# Patient Record
Sex: FEMALE | Race: WHITE | NOT HISPANIC OR LATINO | Employment: FULL TIME | URBAN - METROPOLITAN AREA
[De-identification: names, ages, dates, MRNs, and addresses within clinical notes are randomized per-mention and may not be internally consistent; named-entity substitution may affect disease eponyms.]

---

## 2020-12-01 ENCOUNTER — PATIENT MESSAGE (OUTPATIENT)
Dept: PRIMARY CARE CLINIC | Facility: CLINIC | Age: 20
End: 2020-12-01

## 2023-10-30 ENCOUNTER — OFFICE VISIT (OUTPATIENT)
Dept: PRIMARY CARE CLINIC | Facility: CLINIC | Age: 23
End: 2023-10-30
Payer: COMMERCIAL

## 2023-10-30 DIAGNOSIS — Z00.00 LABORATORY EXAM ORDERED AS PART OF ROUTINE GENERAL MEDICAL EXAMINATION: ICD-10-CM

## 2023-10-30 DIAGNOSIS — R11.0 NAUSEA: ICD-10-CM

## 2023-10-30 DIAGNOSIS — K30 INDIGESTION: ICD-10-CM

## 2023-10-30 DIAGNOSIS — Z76.89 ESTABLISHING CARE WITH NEW DOCTOR, ENCOUNTER FOR: Primary | ICD-10-CM

## 2023-10-30 PROCEDURE — 1159F MED LIST DOCD IN RCRD: CPT | Mod: CPTII,95,, | Performed by: INTERNAL MEDICINE

## 2023-10-30 PROCEDURE — 99203 PR OFFICE/OUTPT VISIT, NEW, LEVL III, 30-44 MIN: ICD-10-PCS | Mod: 95,,, | Performed by: INTERNAL MEDICINE

## 2023-10-30 PROCEDURE — 1159F PR MEDICATION LIST DOCUMENTED IN MEDICAL RECORD: ICD-10-PCS | Mod: CPTII,95,, | Performed by: INTERNAL MEDICINE

## 2023-10-30 PROCEDURE — 99203 OFFICE O/P NEW LOW 30 MIN: CPT | Mod: 95,,, | Performed by: INTERNAL MEDICINE

## 2023-10-30 RX ORDER — ONDANSETRON 4 MG/1
4 TABLET, ORALLY DISINTEGRATING ORAL EVERY 8 HOURS PRN
Qty: 30 TABLET | Refills: 0 | Status: SHIPPED | OUTPATIENT
Start: 2023-10-30 | End: 2024-01-29 | Stop reason: SDUPTHER

## 2023-10-30 RX ORDER — PANTOPRAZOLE SODIUM 40 MG/1
TABLET, DELAYED RELEASE ORAL
Qty: 42 TABLET | Refills: 0 | Status: SHIPPED | OUTPATIENT
Start: 2023-10-30 | End: 2023-12-12

## 2023-10-30 NOTE — PROGRESS NOTES
The patient location is: [place]  The chief complaint leading to consultation is: migraines    Visit type: audiovisual    Face to Face time with patient:   15 minutes of total time spent on the encounter, which includes face to face time and non-face to face time preparing to see the patient (eg, review of tests), Obtaining and/or reviewing separately obtained history, Documenting clinical information in the electronic or other health record, Independently interpreting results (not separately reported) and communicating results to the patient/family/caregiver, or Care coordination (not separately reported).         Each patient to whom he or she provides medical services by telemedicine is:  (1) informed of the relationship between the physician and patient and the respective role of any other health care provider with respect to management of the patient; and (2) notified that he or she may decline to receive medical services by telemedicine and may withdraw from such care at any time.    Notes:     Subjective     Patient ID: Marjan Kaba is a 23 y.o. female.    Chief Complaint: Migraine and Nausea      Abdominal Pain  This is a recurrent problem. The current episode started more than 1 month ago. The onset quality is gradual. The problem occurs every several days. The most recent episode lasted 1 hours. The problem has been unchanged. The pain is located in the RLQ, RUQ and generalized abdominal region. The pain is at a severity of 4/10. The pain is mild. The quality of the pain is burning, cramping and sharp. Associated symptoms include anorexia, arthralgias, belching, constipation, diarrhea, headaches, melena, myalgias, nausea, vomiting and weight loss. Pertinent negatives include no dysuria, fever, flatus, frequency, hematochezia or hematuria. The pain is aggravated by drinking alcohol and eating. The pain is relieved by Certain positions and vomiting. She has tried acetaminophen and antacids for the symptoms. The  treatment provided no relief. Her past medical history is significant for GERD. There is no history of abdominal surgery, colon cancer, Crohn's disease, gallstones, irritable bowel syndrome, pancreatitis, PUD or ulcerative colitis. Patient's medical history does not include kidney stones and UTI.       Past Medical History:   Diagnosis Date    ADHD     Bipolar 1 disorder     Depression     Sleep apnea, unspecified      Review of patient's allergies indicates:  No Known Allergies  No past surgical history on file.  Family History   Problem Relation Age of Onset    Lung cancer Maternal Grandmother     Heart disease Paternal Grandfather      Social History     Socioeconomic History    Marital status: Single   Tobacco Use    Smoking status: Former     Types: Cigarettes    Smokeless tobacco: Never   Substance and Sexual Activity    Alcohol use: Yes    Drug use: Yes     Types: Marijuana    Sexual activity: Yes     Partners: Male     Birth control/protection: I.U.D.         There were no vitals taken for this visit.  Outpatient Medications as of 10/30/2023   Medication Sig Dispense Refill    acyclovir (ZOVIRAX) 400 MG tablet Take 1 tablet (400 mg total) by mouth 2 (two) times daily. 60 tablet 11    buPROPion (WELLBUTRIN XL) 300 MG 24 hr tablet Take 300 mg by mouth every morning.      busPIRone (BUSPAR) 10 MG tablet TAKE 1 TABLET BY MOUTH IN THE MORNING AND AFTERNOON WITH FOOD      dextroamphetamine-amphetamine (ADDERALL XR) 15 MG 24 hr capsule       hydrOXYzine HCL (ATARAX) 25 MG tablet TAKE 2 TABLETS BY MOUTH EVERY NIGHT AT BEDTIME FOR SLEEP OR ANXIETY      lurasidone (LATUDA) 20 mg Tab tablet TAKE 1 TABLET BY MOUTH EVERY NIGHT AT BEDTIME WITH MEALS.      lurasidone (LATUDA) 40 mg Tab tablet TAKE 1 TABLET BY MOUTH AT BEDTIME WITH MEAL      pantoprazole (PROTONIX) 40 MG tablet 1 tablet in the AM with water 42 tablet 0    prazosin (MINIPRESS) 2 MG Cap Take 4 mg by mouth every evening.       Facility-Administered Medications  as of 10/30/2023   Medication Dose Route Frequency Provider Last Rate Last Admin    levonorgestreL (KYLEENA) 17.5 mcg/24 hrs (5 yrs) 19.5 mg IUD 17.5 mcg  1 Intra Uterine Device Intrauterine 1 time in Clinic/HOD Cindi Huerta MD           Review of Systems   Constitutional:  Positive for weight loss. Negative for fever.   Gastrointestinal:  Positive for abdominal pain, anorexia, constipation, diarrhea, melena, nausea and vomiting. Negative for flatus and hematochezia.   Genitourinary:  Negative for dysuria, frequency and hematuria.   Musculoskeletal:  Positive for arthralgias and myalgias.   Neurological:  Positive for headaches.          Objective     Physical Exam  Constitutional:       General: She is not in acute distress.     Appearance: Normal appearance. She is not ill-appearing, toxic-appearing or diaphoretic.   Pulmonary:      Effort: No respiratory distress.   Neurological:      Mental Status: She is alert and oriented to person, place, and time.   Psychiatric:         Mood and Affect: Mood normal.         Behavior: Behavior normal.            Assessment and Plan     1. Establishing care with new doctor, encounter for    2. Laboratory exam ordered as part of routine general medical examination  -     Lipid Panel; Future; Expected date: 10/30/2023  -     Urinalysis; Future; Expected date: 10/30/2023  -     Hemoglobin A1C; Future; Expected date: 10/30/2023  -     TSH; Future; Expected date: 10/30/2023  -     Comprehensive Metabolic Panel; Future; Expected date: 10/30/2023  -     CBC Auto Differential; Future; Expected date: 10/30/2023  -     HEPATITIS C ANTIBODY; Future; Expected date: 10/30/2023    3. Indigestion  -     pantoprazole (PROTONIX) 40 MG tablet; 1 tablet in the AM with water  Dispense: 42 tablet; Refill: 0    4. Nausea  -     ondansetron (ZOFRAN-ODT) 4 MG TbDL; Take 1 tablet (4 mg total) by mouth every 8 (eight) hours as needed (nausea).  Dispense: 30 tablet; Refill: 0       Gerd  diet  Labs a few days before in person physical.      Follow up if symptoms worsen or fail to improve.      There is no immunization history on file for this patient.

## 2023-12-12 DIAGNOSIS — K30 INDIGESTION: ICD-10-CM

## 2023-12-12 RX ORDER — PANTOPRAZOLE SODIUM 40 MG/1
TABLET, DELAYED RELEASE ORAL
Qty: 42 TABLET | Refills: 0 | Status: SHIPPED | OUTPATIENT
Start: 2023-12-12 | End: 2024-01-22

## 2024-01-20 DIAGNOSIS — K30 INDIGESTION: ICD-10-CM

## 2024-01-22 RX ORDER — PANTOPRAZOLE SODIUM 40 MG/1
TABLET, DELAYED RELEASE ORAL
Qty: 42 TABLET | Refills: 0 | Status: SHIPPED | OUTPATIENT
Start: 2024-01-22 | End: 2024-03-05

## 2024-01-29 DIAGNOSIS — R11.0 NAUSEA: ICD-10-CM

## 2024-01-30 RX ORDER — ONDANSETRON 4 MG/1
4 TABLET, ORALLY DISINTEGRATING ORAL EVERY 8 HOURS PRN
Qty: 30 TABLET | Refills: 0 | Status: SHIPPED | OUTPATIENT
Start: 2024-01-30 | End: 2024-01-31

## 2024-03-04 DIAGNOSIS — K30 INDIGESTION: ICD-10-CM

## 2024-03-05 DIAGNOSIS — K30 INDIGESTION: ICD-10-CM

## 2024-03-05 RX ORDER — PANTOPRAZOLE SODIUM 40 MG/1
TABLET, DELAYED RELEASE ORAL
Qty: 42 TABLET | Refills: 0 | Status: SHIPPED | OUTPATIENT
Start: 2024-03-05 | End: 2024-03-05

## 2024-03-05 RX ORDER — PANTOPRAZOLE SODIUM 40 MG/1
TABLET, DELAYED RELEASE ORAL
Qty: 90 TABLET | Refills: 0 | Status: SHIPPED | OUTPATIENT
Start: 2024-03-05 | End: 2024-06-08

## 2024-06-08 DIAGNOSIS — K30 INDIGESTION: ICD-10-CM

## 2024-06-08 RX ORDER — PANTOPRAZOLE SODIUM 40 MG/1
TABLET, DELAYED RELEASE ORAL
Qty: 90 TABLET | Refills: 1 | Status: SHIPPED | OUTPATIENT
Start: 2024-06-08

## 2024-06-08 NOTE — TELEPHONE ENCOUNTER
Refill Decision Note   Marjan Kaba  is requesting a refill authorization.  Brief Assessment and Rationale for Refill:  Approve     Medication Therapy Plan:        Comments:     Note composed:4:51 PM 06/08/2024

## 2024-06-08 NOTE — TELEPHONE ENCOUNTER
No care due was identified.  [facility] Embedded Care Due Messages. Reference number: [id number].   6/08/2024 3:23:16 AM CDT

## 2024-12-05 DIAGNOSIS — K30 INDIGESTION: ICD-10-CM

## 2024-12-05 DIAGNOSIS — Z00.00 ENCOUNTER FOR SCREENING AND PREVENTATIVE CARE: Primary | ICD-10-CM

## 2024-12-05 RX ORDER — PANTOPRAZOLE SODIUM 40 MG/1
TABLET, DELAYED RELEASE ORAL
Qty: 90 TABLET | Refills: 0 | Status: SHIPPED | OUTPATIENT
Start: 2024-12-05

## 2024-12-05 NOTE — TELEPHONE ENCOUNTER
Refill Decision Note   Marjan Kaba  is requesting a refill authorization.    Brief Assessment and Rationale for Refill:  Approve       Medication Therapy Plan:         Comments:     Note composed:1:43 PM 12/05/2024

## 2024-12-05 NOTE — TELEPHONE ENCOUNTER
No care due was identified.  [facility] Embedded Care Due Messages. Reference number: [id number].   12/05/2024 3:44:02 AM CST